# Patient Record
Sex: MALE | Race: WHITE
[De-identification: names, ages, dates, MRNs, and addresses within clinical notes are randomized per-mention and may not be internally consistent; named-entity substitution may affect disease eponyms.]

---

## 2021-11-21 ENCOUNTER — HOSPITAL ENCOUNTER (EMERGENCY)
Dept: HOSPITAL 97 - ER | Age: 36
Discharge: ANOTHER HOSPITAL | End: 2021-11-21
Payer: COMMERCIAL

## 2021-11-21 VITALS — TEMPERATURE: 97.5 F

## 2021-11-21 VITALS — OXYGEN SATURATION: 99 % | SYSTOLIC BLOOD PRESSURE: 115 MMHG | DIASTOLIC BLOOD PRESSURE: 77 MMHG

## 2021-11-21 DIAGNOSIS — K81.0: ICD-10-CM

## 2021-11-21 DIAGNOSIS — F17.210: ICD-10-CM

## 2021-11-21 DIAGNOSIS — Z20.822: ICD-10-CM

## 2021-11-21 DIAGNOSIS — I10: ICD-10-CM

## 2021-11-21 DIAGNOSIS — R17: ICD-10-CM

## 2021-11-21 DIAGNOSIS — K85.10: Primary | ICD-10-CM

## 2021-11-21 LAB
ALBUMIN SERPL BCP-MCNC: 2.3 G/DL (ref 3.4–5)
ALP SERPL-CCNC: 187 U/L (ref 45–117)
ALT SERPL W P-5'-P-CCNC: 58 U/L (ref 12–78)
AST SERPL W P-5'-P-CCNC: 314 U/L (ref 15–37)
BUN BLD-MCNC: 15 MG/DL (ref 7–18)
GLUCOSE SERPLBLD-MCNC: 98 MG/DL (ref 74–106)
HCT VFR BLD CALC: 38 % (ref 39.6–49)
INR BLD: 1.6
LYMPHOCYTES # SPEC AUTO: 2.3 K/UL (ref 0.7–4.9)
MAGNESIUM SERPL-MCNC: 1.3 MG/DL (ref 1.8–2.4)
METHAMPHET UR QL SCN: NEGATIVE
NT-PROBNP SERPL-MCNC: 14 PG/ML (ref ?–125)
PMV BLD: 8.9 FL (ref 7.6–11.3)
POTASSIUM SERPL-SCNC: 2.5 MMOL/L (ref 3.5–5.1)
RBC # BLD: 3.91 M/UL (ref 4.33–5.43)
THC SERPL-MCNC: NEGATIVE NG/ML
TROPONIN (EMERG DEPT USE ONLY): < 0.02 NG/ML (ref 0–0.04)

## 2021-11-21 PROCEDURE — 99283 EMERGENCY DEPT VISIT LOW MDM: CPT

## 2021-11-21 PROCEDURE — 85610 PROTHROMBIN TIME: CPT

## 2021-11-21 PROCEDURE — 76705 ECHO EXAM OF ABDOMEN: CPT

## 2021-11-21 PROCEDURE — 83880 ASSAY OF NATRIURETIC PEPTIDE: CPT

## 2021-11-21 PROCEDURE — 80048 BASIC METABOLIC PNL TOTAL CA: CPT

## 2021-11-21 PROCEDURE — 80320 DRUG SCREEN QUANTALCOHOLS: CPT

## 2021-11-21 PROCEDURE — 82150 ASSAY OF AMYLASE: CPT

## 2021-11-21 PROCEDURE — 96375 TX/PRO/DX INJ NEW DRUG ADDON: CPT

## 2021-11-21 PROCEDURE — 83735 ASSAY OF MAGNESIUM: CPT

## 2021-11-21 PROCEDURE — 84484 ASSAY OF TROPONIN QUANT: CPT

## 2021-11-21 PROCEDURE — 71045 X-RAY EXAM CHEST 1 VIEW: CPT

## 2021-11-21 PROCEDURE — 80307 DRUG TEST PRSMV CHEM ANLYZR: CPT

## 2021-11-21 PROCEDURE — 80076 HEPATIC FUNCTION PANEL: CPT

## 2021-11-21 PROCEDURE — 93005 ELECTROCARDIOGRAM TRACING: CPT

## 2021-11-21 PROCEDURE — 96374 THER/PROPH/DIAG INJ IV PUSH: CPT

## 2021-11-21 PROCEDURE — 85025 COMPLETE CBC W/AUTO DIFF WBC: CPT

## 2021-11-21 PROCEDURE — 83690 ASSAY OF LIPASE: CPT

## 2021-11-21 PROCEDURE — 74177 CT ABD & PELVIS W/CONTRAST: CPT

## 2021-11-21 PROCEDURE — 36415 COLL VENOUS BLD VENIPUNCTURE: CPT

## 2021-11-21 NOTE — RAD REPORT
EXAM DESCRIPTION:  US - Abdomen Exam Limited - 11/21/2021 4:04 am

 

CLINICAL HISTORY:  elevated LFT, Lipase,RUQ

 

COMPARISON:  Abdomen   Pelvis W Contrast dated 11/21/2021

 

FINDINGS:  Large amount of sludge is present layering on the dependent portion of the distended gallb
ladder. Small sub centimeter stones, few in number, are seen within the sludge. Gallbladder wall is t
hickened. No measurable quantity of pericholecystic fluid seen.

 

No common duct stone or biliary tree dilatation identified.

 

Partially imaged liver shows diffuse fatty infiltration pattern.

 

IMPRESSION:  Distended gallbladder with multiple small stones and a large quantity of sludge. Wall is
 thickened.

 

Findings are consistent with acute cholecystitis.

 

No common duct stone seen and no biliary tree dilatation.

## 2021-11-21 NOTE — RAD REPORT
EXAM DESCRIPTION:  RAD - Chest Single View - 11/21/2021 2:16 am

 

CLINICAL HISTORY:  CHEST PAIN

 

COMPARISON:  None

 

TECHNIQUE:  AP portable chest image was obtained 11/21/2021 2:16 am .

 

FINDINGS:  No focal mass or consolidation. Trace amount of stranding at the left base is probably art
ifact from atelectasis and pericardial fat. Heart size prominent, accentuated by shallow inspiration 
and portable technique. No measurable pleural effusion and no pneumothorax. No acute bony abnormality
 seen. No acute aortic findings suspected.

 

IMPRESSION:  No acute cardiopulmonary process.

## 2021-11-21 NOTE — ER
Nurse's Notes                                                                                     

 [facility]                                                                 

Name: Lauri Chavez                                                                                 

Age: 36 yrs                                                                                       

Sex: Male                                                                                         

: 1985                                                                                   

MRN: [id number]                                                                                   

Arrival Date: 2021                                                                          

Time: 01:41                                                                                       

Account#: [id number]                                                                            

Bed 13                                                                                            

Private MD:                                                                                       

Diagnosis: Biliary acute pancreatitis without necrosis or infection;Acute                         

  cholecystitis;Unspecified jaundice                                                              

                                                                                                  

Presentation:                                                                                     

                                                                                             

01:45 Chief complaint: Patient states: He has been feeling a pressure in his chest and his    aj1 

      blood pressure has been low on his home blood pressure monitor for the past couple          

      days. Patient is jaundiced. States that he has also been feeling nauseated. He saw his      

      doctor last week and had labs done and states that his liver functions were "mildly         

      elevated" Patient also states that his urine has been dark in color, and that he has        

      lost 50 pounds in the past 2 months, but he attributes that to catching COVID twice,        

      states that he tested negative for COVID last week. Coronavirus screen: Vaccine status:     

      Patient reports being unvaccinated. Ebola Screen: Patient denies travel to an               

      Ebola-affected area in the 21 days before illness onset. Initial Sepsis Screen: Does        

      the patient meet any 2 criteria? No. Patient's initial sepsis screen is negative. Does      

      the patient have a suspected source of infection? No. Patient's initial sepsis screen       

      is negative. Risk Assessment: Do you want to hurt yourself or someone else? Patient         

      reports no desire to harm self or others. Onset of symptoms was 2021.          

01:45 Method Of Arrival: EMS: [facility]                                                     aj 

01:45 Acuity: SHEMAR 3                                                                           aj1 

                                                                                                  

Triage Assessment:                                                                                

01:48 General: Appears in no apparent distress. uncomfortable, Behavior is calm, drowsy.      aj1 

      Pain: Complains of pain in chest Pain does not radiate. Pain currently is 5 out of 10       

      on a pain scale. Quality of pain is described as pressure, Pain began 2-3 days ago.         

      Neuro: Level of Consciousness is awake, alert, obeys commands, Oriented to person,          

      place, time, situation, Speech is normal, Facial symmetry appears normal.                   

      Cardiovascular: Patient's skin is warm and dry. Respiratory: Airway is patent               

      Respiratory effort is even, unlabored, Respiratory pattern is regular, symmetrical.         

                                                                                                  

Historical:                                                                                       

- Allergies:                                                                                      

01:48 Tramadol HCl;                                                                           aj1 

- Home Meds:                                                                                      

01:48 None [Active];                                                                          aj1 

- PMHx:                                                                                           

01:48 Hypertensive disorder; "some kind of heart issue";                                      aj1 

                                                                                                  

- Immunization history:: Flu vaccine is not up to date.                                           

- Social history:: Smoking status: Patient reports the use of cigarette tobacco                   

  products, smokes one-half pack cigarettes per day, Patient uses alcohol, states he              

  drinks 4 drinks per day . Patient/guardian denies using street drugs.                           

                                                                                                  

                                                                                                  

Screenin:01 Abuse screen: Denies threats or abuse. Nutritional screening: No deficits noted.        as6 

      Tuberculosis screening: No symptoms or risk factors identified. Fall Risk None              

      identified.                                                                                 

                                                                                                  

Assessment:                                                                                       

07:00 Reassessment: upon morning assessment pt appears in no distress, respirations even and  as6 

      unlabored, family at bedside.                                                               

09:39 Reassessment: report given to EMS for transfer, pt stable and no signs of distress at   as6 

      time of transfer.                                                                           

                                                                                                  

Vital Signs:                                                                                      

01:45  / 69; Pulse 88; Resp 15; Temp 97.5; Pulse Ox 98% on R/A; Weight 90.72 kg (R);    aj1 

      Height 5 ft. 10 in. (177.80 cm) (R); Pain 5/10;                                             

07:00  / 68; Pulse 95; Resp 18 S; Pulse Ox 97% on R/A;                                  as6 

08:30  / 75; Pulse 96; Resp 18; Pulse Ox 96% on R/A;                                    as6 

09:39  / 77; Pulse 76; Resp 18 S; Pulse Ox 99% on R/A;                                  as6 

01:45 Body Mass Index 28.70 (90.72 kg, 177.80 cm)                                             aj1 

                                                                                                  

ED Course:                                                                                        

01:41 Patient arrived in ED.                                                                  mw2 

01:45 Cristino Maher MD is Attending Physician.                                             mh7 

01:48 Triage completed.                                                                       aj1 

01:48 Arm band placed on Patient placed in an exam room.                                      aj1 

02:13 Doni Swain, RN is Primary Nurse.                                                     mr2 

02:18 XRAY Chest (1 view) In Process Unspecified.                                             EDMS

03:19 Notified ED physician of a critical lab result(s). Potassium of 2.5, , Total     bb  

      bili 15.6, magnesium of 1.3 Dr Maher notified.                                             

04:04 US Abdomen Limited In Process Unspecified.                                              EDMS

04:25 CT Abd/Pelvis - IV Contrast Only In Process Unspecified.                                EDMS

05:27 initiated a transfer with Lorna from [facility].                mw2 

05:59 [facility] denied due to capacity.                                                mw2 

06:12 initiated a transfer with Ana Cristina Hernandez from [facility].                  mw2 

06:54 Ana Cristina from [facility] stated " we might have to do doc to doc after     mw2 

      shift change.".                                                                             

07:07 Attending Physician role handed off by Cristino Maher MD                              rn  

07:07 Homer Koch MD is Attending Physician.                                                rn  

08:16 pt accepted in transfer to [facility] by Dr Cowan, admin approval given by       nova John RN.                                                                        

08:44 Report given to Netta CORTES, [facility].                                                as6 

                                                                                                  

Administered Medications:                                                                         

04:11 Drug: Potassium Chloride 40 mEq Route: PO;                                              mr2 

05:00 Drug: Magnesium Sulfate 1 grams Route: IVPB; Infused Over: 1 hrs; Site: right           bb  

      antecubital;                                                                                

05:20 Drug: Zofran (Ondansetron) 4 mg Route: IVP; Site: right antecubital;                    mr2 

05:21 Drug: morphine 4 mg Route: IVP; Site: right antecubital;                                mr2 

06:25 Drug: NS 0.9% 1000 ml Route: IV; Rate: 1000 ml; Site: right antecubital;                bb  

06:26 Drug: Zosyn (piperacillin-tazobactam) 3.375 grams Route: IVPB; Infused Over: 60 mins;   bb  

      Site: right antecubital;                                                                    

06:36 Drug: Potassium Chloride 20 mEq Route: IV; Rate: per protocol; Site: right antecubital; mr2 

07:05 Drug: Zofran (Ondansetron) 4 mg Route: IVP; Site: right antecubital;                    mr2 

07:05 Drug: Dilaudid (HYDROmorphone) 1 mg Route: IVP; Site: right antecubital;                mr2 

                                                                                                  

                                                                                                  

Outcome:                                                                                          

07:29 ER care complete, transfer ordered by MD.                                               rn  

09:41 Patient left the ED.                                                                    as6 

                                                                                                  

Signatures:                                                                                       

Dispatcher [employer]                           EDMS                                                 

Naomi Arroyo Angela, RN                     RN   aj1                                                  

Tresa Olivo RN RN   bb                                                   

Homer Koch MD MD rn [place]brook, Bridgette                            mw2                                                  

Cristino Maher MD MD   7                                                  

Doni Swain RN                       RN   mr2                                                  

Aj Brice RN                      RN   as6                                                  

                                                                                                  

**************************************************************************************************

## 2021-11-21 NOTE — RAD REPORT
EXAM DESCRIPTION:  CT - Abdomen   Pelvis W Contrast - 11/21/2021 6:34 am

 

CLINICAL HISTORY:  The patient is 36 years old and is Male; pancreatitis

 

TECHNIQUE:  Axial computed tomography images of the abdomen and pelvis with intravenous contrast.   S
agittal and coronal reformatted images were created and reviewed.   This CT exam was performed using 
one or more of the following dose reduction techniques:   automated exposure control, adjustment of t
he mA and/or kV according to patient size, and/or use of iterative reconstruction technique.

 

COMPARISON:  No relevant prior studies available.

 

FINDINGS:  Lung bases:   Unremarkable.   No mass.   No consolidation.

ABDOMEN:

  Liver:   Enlarged fatty liver. Innumerable subcentimeter hypodense lesions in the liver, too small 
to accurately characterize, largest is 4 mm.

  Gallbladder and bile ducts:   Markedly distended gallbladder. Small calcified stones visualized. Ga
llbladder wall thickening is present with pericholecystic fat stranding.

        No ductal dilation.

  Pancreas:   No findings to suggest acute pancreatitis. No mass visualized.   No ductal dilation.

  Spleen:   Mild splenomegaly.

  Adrenals:   Unremarkable.   No mass.

  Kidneys and ureters:   Unremarkable.   No solid mass.   No hydronephrosis.

  Stomach and bowel:   No bowel dilatation or obstruction. No bowel wall thickening.

PELVIS:

  Appendix:   The visualized appendix is normal. No pericecal inflammation to suggest acute appendici
tis.

  Bladder:   Unremarkable.   No mass.

  Reproductive:   Unremarkable as visualized.

ABDOMEN and PELVIS:

  Intraperitoneal space:   Small amount of peripancreatic free fluid. Mild edema in the pancreatic he
ad. No pancreatic duct dilatation.

        No free air.

  Bones/joints:   Mild scoliosis.

        No acute fracture.   No dislocation.

  Soft tissues:   Unremarkable.

  Vasculature:   Recanalized umbilical vein. No abdominal aortic aneurysm.

  Lymph nodes:   No pathologically enlarged lymph nodes.

 

IMPRESSION:  1.   Distended gallbladder with cholelithiasis and findings suggesting cholecystitis.

2.   Findings in the pancreas are suggestive of mild acute pancreatitis.

3.   Enlarged fatty liver. Innumerable subcentimeter hypodense lesions in the liver, too small to acc
urately characterize, largest 4 mm. No follow-up imaging recommended.

4.   Mild splenomegaly.

 

Electronically signed by:   Negra Traylor MD   11/21/2021 5:06 AM CST Workstation: [id number]

 

 

 

Due to temporary technical issues with the PACS/Fluency reporting system, reports are being signed by
 the in house radiologists without review as a courtesy to insure prompt reporting. The interpreting 
radiologist is fully responsible for the content of the report.

## 2021-11-21 NOTE — EDPHYS
Physician Documentation                                                                           

 [facility]                                                                 

Name: Lauri Chavez                                                                                 

Age: 36 yrs                                                                                       

Sex: Male                                                                                         

: 1985                                                                                   

MRN: [id number]                                                                                   

Arrival Date: 2021                                                                          

Time: 01:41                                                                                       

Account#: [id number]                                                                            

Bed 13                                                                                            

Private MD:                                                                                       

ED Physician Homer Koch                                                                         

HPI:                                                                                              

                                                                                             

02:06 This 36 yrs old Male presents to ER via EMS with complaints of Chest pain.              mh7 

02:06 The patient or guardian reports chest pain that is located primarily in the substernal  mh7 

      area. The pain does not radiate. Associated signs and symptoms: Pertinent positives:        

      nausea, Pertinent negatives: abdominal pain, cough, diaphoresis, dizziness, headache,       

      lower extremity pain, lower extremity swelling, lightheadedness, near syncope,              

      palpitations, recent travel, shortness of breath, syncope, vomiting. The chest pain is      

      described as a pressure. Duration: The patient or guardian reports multiple episodes,       

      that are intermittent, that wax and wane, with no pattern. Modifying factors: The           

      symptoms are alleviated by nothing. the symptoms are aggravated by nothing. Severity of     

      pain: At its worst the pain was moderate last night, in the emergency department the        

      pain has improved moderately.                                                               

                                                                                                  

Historical:                                                                                       

- Allergies:                                                                                      

01:48 Tramadol HCl;                                                                           aj1 

- Home Meds:                                                                                      

01:48 None [Active];                                                                          aj1 

- PMHx:                                                                                           

01:48 Hypertensive disorder; "some kind of heart issue";                                      aj1 

                                                                                                  

- Immunization history:: Flu vaccine is not up to date.                                           

- Social history:: Smoking status: Patient reports the use of cigarette tobacco                   

  products, smokes one-half pack cigarettes per day, Patient uses alcohol, states he              

  drinks 4 drinks per day . Patient/guardian denies using street drugs.                           

                                                                                                  

                                                                                                  

ROS:                                                                                              

02:06 Constitutional: Negative for fever, chills, and weight loss, Eyes: Negative for injury, mh7 

      pain, redness, and discharge, ENT: Negative for injury, pain, and discharge, Neck:          

      Negative for injury, pain, and swelling, Respiratory: Negative for shortness of breath,     

      cough, wheezing, and pleuritic chest pain, Back: Negative for injury and pain, :          

      Negative for injury, bleeding, discharge, and swelling, MS/Extremity: Negative for          

      injury and deformity, Neuro: Negative for headache, weakness, numbness, tingling, and       

      seizure, Psych: Negative for depression, anxiety, suicide ideation, homicidal ideation,     

      and hallucinations, Allergy/Immunology: Negative for hives, rash, and allergies,            

      Endocrine: Negative for neck swelling, polydipsia, polyuria, polyphagia, and marked         

      weight changes, Hematologic/Lymphatic: Negative for swollen nodes, abnormal bleeding,       

      and unusual bruising.                                                                       

                                                                                                  

Exam:                                                                                             

02:06 Constitutional:  This is a well developed, well nourished patient who is awake, alert,  mh7 

      and in no acute distress. Head/Face:  Normocephalic, atraumatic.                            

02:06 Neck:  Trachea midline, no thyromegaly or masses palpated, and no cervical                  

      lymphadenopathy.  Supple, full range of motion without nuchal rigidity, or vertebral        

      point tenderness.  No Meningismus.                                                          

02:06 Cardiovascular:  Regular rate and rhythm with a normal S1 and S2.  No gallops, murmurs,     

      or rubs.  Normal PMI, no JVD.  No pulse deficits. Respiratory:  Lungs have equal breath     

      sounds bilaterally, clear to auscultation and percussion.  No rales, rhonchi or wheezes     

      noted.  No increased work of breathing, no retractions or nasal flaring. Abdomen/GI:        

      Soft, non-tender, with normal bowel sounds.  No distension or tympany.  No guarding or      

      rebound.  No evidence of tenderness throughout. Back:  No spinal tenderness.  No            

      costovertebral tenderness.  Full range of motion.                                           

02:06 MS/ Extremity:  Pulses equal, no cyanosis.  Neurovascular intact.  Full, normal range       

      of motion. Neuro:  Awake and alert, GCS 15, oriented to person, place, time, and            

      situation.  Cranial nerves II-XII grossly intact.  Motor strength 5/5 in all                

      extremities.  Sensory grossly intact.  Cerebellar exam normal.  Normal gait. Psych:         

      Awake, alert, with orientation to person, place and time.  Behavior, mood, and affect       

      are within normal limits.                                                                   

02:06 Eyes: Sclera: icterus, is present.                                                          

02:06 Chest/axilla: Inspection: normal, Palpation: tenderness, that is moderate, of the           

      mid-sternal area, that totally reproduces the patient's complaints, Axilla: are normal,     

      Lymph nodes: lymphadenopathy is not appreciated.                                            

                                                                                                  

Vital Signs:                                                                                      

01:45  / 69; Pulse 88; Resp 15; Temp 97.5; Pulse Ox 98% on R/A; Weight 90.72 kg (R);    aj1 

      Height 5 ft. 10 in. (177.80 cm) (R); Pain 5/10;                                             

07:00  / 68; Pulse 95; Resp 18 S; Pulse Ox 97% on R/A;                                  as6 

08:30  / 75; Pulse 96; Resp 18; Pulse Ox 96% on R/A;                                    as6 

09:39  / 77; Pulse 76; Resp 18 S; Pulse Ox 99% on R/A;                                  as6 

01:45 Body Mass Index 28.70 (90.72 kg, 177.80 cm)                                             aj1 

                                                                                                  

MDM:                                                                                              

07:07 Patient medically screened.                                                             rn  

07:25 Differential diagnosis: cholecystitis, Cholelithiasis esophagitis, gastritis,           rn  

      gastroesophageal reflux disease (GERD), pancreatitis, ETOH liver disease, cirrhosis,        

      gallstone pancreatitis. Data reviewed: vital signs, nurses notes, lab test result(s),       

      radiologic studies, CT scan, ultrasound, and as a result, I will admit patient. Data        

      interpreted: Cardiac monitor: rate is 88 beats/min, rhythm is normal sinus rhythm,          

      regular, with no ectopy, Interpretation: normal rate, normal rhythm, Pulse oximetry: on     

      room air is 98 %. Interpretation: normal. Counseling: I had a detailed discussion with      

      the patient and/or guardian regarding: the historical points, exam findings, and any        

      diagnostic results supporting the discharge/admit diagnosis, lab results, radiology         

      results, the need for further work-up and treatment in the hospital, the need to            

      transfer to another facility, for higher level of care, [facility]        

      does not immediately have the required specialist. Response to treatment: the patient's     

      symptoms have mildly improved after treatment, and as a result, I will admit patient.       

      Admission orders: after a detailed discussion of the patient's condition and case, the      

      admit orders are written by me. ED course: Accepted for transfer to [facility] for           

      GI/surgical consult given gallstone pancreatitis and possible early cholecystitis. .        

07:40 ED course: Spoke at length with patient regarding ETOH cessation and answered all       rn  

      questions..                                                                                 

                                                                                                  

                                                                                             

02:04 Order name: Basic Metabolic Panel                                                       [facility] 

                                                                                             

02:04 Order name: CBC with Diff                                                               [facility] 

                                                                                             

02:04 Order name: LFT's; Complete Time: 03:20                                                 [facility] 

                                                                                             

02:04 Order name: Magnesium; Complete Time: 03:20                                             7 

                                                                                             

02:04 Order name: NT PRO-BNP; Complete Time: 03:20                                            7 

                                                                                             

02:04 Order name: PT-INR; Complete Time: 03:20                                                7 

                                                                                             

02:04 Order name: Troponin (emerg Dept Use Only); Complete Time: 03:20                        7 

                                                                                             

02:04 Order name: UDS; Complete Time: 03:20                                                   7 

                                                                                             

02:04 Order name: ETOH Level; Complete Time: 03:20                                            [facility] 

                                                                                             

02:04 Order name: Basic Metabolic Panel; Complete Time: 03:20                                 [facility]

                                                                                             

02:04 Order name: CBC with Automated Diff; Complete Time: 03:20                               [facility]

                                                                                             

02:06 Order name: Lipase; Complete Time: 03:20                                                7 

                                                                                             

03:32 Order name: Amylase, Serum                                                              [facility] 

                                                                                             

03:33 Order name: Amylase; Complete Time: 04:43                                               [facility]

                                                                                             

02:04 Order name: XRAY Chest (1 view); Complete Time: 19:12                                   [facility] 

                                                                                             

02:04 Order name: EKG; Complete Time: 02:05                                                   [facility] 

                                                                                             

02:04 Order name: Cardiac monitoring; Complete Time: 07:39                                    [facility] 

                                                                                             

03:24 Order name: US Abdomen Limited; Complete Time: 19:12                                    [facility] 

                                                                                             

03:25 Order name: CT Abd/Pelvis - IV Contrast Only                                            [facility] 

                                                                                             

02:04 Order name: EKG - Nurse/Tech; Complete Time: 08:11                                      [facility] 

                                                                                             

02:04 Order name: IV Saline Lock; Complete Time: 07:39                                        [facility] 

                                                                                             

02:04 Order name: Labs collected and sent; Complete Time: 07:39                               [facility] 

                                                                                             

02:04 Order name: O2 Per Protocol; Complete Time: 07:39                                       [facility] 

                                                                                             

02:04 Order name: O2 Sat Monitoring; Complete Time: 07:39                                     7 

                                                                                                  

Administered Medications:                                                                         

04:11 Drug: Potassium Chloride 40 mEq Route: PO;                                              mr2 

05:00 Drug: Magnesium Sulfate 1 grams Route: IVPB; Infused Over: 1 hrs; Site: right           bb  

      antecubital;                                                                                

05:20 Drug: Zofran (Ondansetron) 4 mg Route: IVP; Site: right antecubital;                    mr2 

05:21 Drug: morphine 4 mg Route: IVP; Site: right antecubital;                                mr2 

06:25 Drug: NS 0.9% 1000 ml Route: IV; Rate: 1000 ml; Site: right antecubital;                bb  

06:26 Drug: Zosyn (piperacillin-tazobactam) 3.375 grams Route: IVPB; Infused Over: 60 mins;   bb  

      Site: right antecubital;                                                                    

06:36 Drug: Potassium Chloride 20 mEq Route: IV; Rate: per protocol; Site: right antecubital; mr2 

07:05 Drug: Zofran (Ondansetron) 4 mg Route: IVP; Site: right antecubital;                    mr2 

07:05 Drug: Dilaudid (HYDROmorphone) 1 mg Route: IVP; Site: right antecubital;                mr2 

                                                                                                  

                                                                                                  

Disposition Summary:                                                                              

21 07:29                                                                                    

Transfer Ordered                                                                                  

      Transfer Location: [facility]                                rn  

      Reason: Higher level of care                                                            rn  

      Condition: Stable                                                                       rn  

      Problem: new                                                                            rn  

      Symptoms: have improved                                                                 rn  

      Accepting Physician: Dr. Cowan(21 09:41)                                            as6 

      Diagnosis                                                                                   

        - Biliary acute pancreatitis without necrosis or infection                            rn  

        - Acute cholecystitis                                                                 rn  

        - Unspecified jaundice                                                                rn  

      Forms:                                                                                      

        - Medication Reconciliation Form                                                      rn  

        - SBAR form                                                                           rn  

Signatures:                                                                                       

Dispatcher [employer]                           EDBita Yi RN                     RN   aj1                                                  

Tresa Olivo, RN                     RN   bb                                                   

Homer Koch MD MD rn Holmes, Maurice, MD MD   7                                                  

Doni Swain RN                       RN   mr2                                                  

Aj Brice RN                      RN   as6                                                  

                                                                                                  

Corrections: (The following items were deleted from the chart)                                    

06:48 05:22 CORONAVIRUS+MR.LAB.BRZ ordered. EDMS                                              EDMS

07:49 06:48 SARS-COV-2 RT PCR ordered. EDMS                                                   [facility]

07:49 07:31 SARS-COV-2 RT PCR reviewed. rn                                                    [facility]

09:41 07:29 Dr. Cowan rn                                                                        as6 

                                                                                                  

**************************************************************************************************

## 2021-11-24 NOTE — EKG
Test Date:    2021-11-21               Test Time:    02:33:59

Technician:   ARUN                                   

                                                     

MEASUREMENT RESULTS:                                       

Intervals:                                           

Rate:         86                                     

TX:           156                                    

QRSD:         96                                     

QT:           470                                    

QTc:          562                                    

Axis:                                                

P:            49                                     

TX:           156                                    

QRS:          45                                     

T:            36                                     

                                                     

INTERPRETIVE STATEMENTS:                                       

                                                     

Normal sinus rhythm

Nonspecific T wave abnormality

Prolonged QT

Abnormal ECG

No previous ECG available for comparison



Electronically Signed On 11-24-21 08:04:25 CST by Jose Cruz Talbot